# Patient Record
Sex: FEMALE | Race: WHITE
[De-identification: names, ages, dates, MRNs, and addresses within clinical notes are randomized per-mention and may not be internally consistent; named-entity substitution may affect disease eponyms.]

---

## 2017-01-09 ENCOUNTER — HOSPITAL ENCOUNTER (OUTPATIENT)
Dept: HOSPITAL 19 - BHSO | Age: 77
End: 2017-01-09
Attending: PSYCHIATRY & NEUROLOGY
Payer: MEDICARE

## 2017-01-09 DIAGNOSIS — F33.1: Primary | ICD-10-CM

## 2017-02-09 ENCOUNTER — HOSPITAL ENCOUNTER (OUTPATIENT)
Dept: HOSPITAL 19 - MC.RAD | Age: 77
End: 2017-02-09
Attending: OBSTETRICS & GYNECOLOGY
Payer: MEDICARE

## 2017-02-09 DIAGNOSIS — Z12.31: Primary | ICD-10-CM

## 2017-07-06 ENCOUNTER — HOSPITAL ENCOUNTER (OUTPATIENT)
Dept: HOSPITAL 19 - BHSO | Age: 77
End: 2017-07-06
Attending: PSYCHIATRY & NEUROLOGY
Payer: MEDICARE

## 2017-07-06 DIAGNOSIS — F31.74: Primary | ICD-10-CM

## 2018-01-01 ENCOUNTER — HOSPITAL ENCOUNTER (EMERGENCY)
Dept: HOSPITAL 19 - COL.ER | Age: 78
Discharge: HOME | End: 2018-01-01
Payer: MEDICARE

## 2018-01-01 VITALS — WEIGHT: 140.21 LBS | BODY MASS INDEX: 23.36 KG/M2 | HEIGHT: 65 IN

## 2018-01-01 VITALS — TEMPERATURE: 97.6 F

## 2018-01-01 VITALS — SYSTOLIC BLOOD PRESSURE: 175 MMHG | DIASTOLIC BLOOD PRESSURE: 82 MMHG | HEART RATE: 87 BPM

## 2018-01-01 DIAGNOSIS — I10: ICD-10-CM

## 2018-01-01 DIAGNOSIS — E11.9: ICD-10-CM

## 2018-01-01 DIAGNOSIS — J32.9: Primary | ICD-10-CM

## 2018-01-01 LAB
ALBUMIN SERPL-MCNC: 4.9 GM/DL (ref 3.5–5)
ALP SERPL-CCNC: 140 U/L (ref 50–136)
ALT SERPL-CCNC: 31 U/L (ref 9–52)
ANION GAP SERPL CALC-SCNC: 13 MMOL/L (ref 7–16)
AST SERPL-CCNC: 20 U/L (ref 15–37)
BASOPHILS # BLD: 0.1 10*3/UL (ref 0–0.2)
BASOPHILS NFR BLD AUTO: 0.5 % (ref 0–2)
BILIRUB SERPL-MCNC: 0.4 MG/DL (ref 0–1)
BUN SERPL-MCNC: 22 MG/DL (ref 7–17)
CALCIUM SERPL-MCNC: 9.9 MG/DL (ref 8.4–10.2)
CHLORIDE SERPL-SCNC: 103 MMOL/L (ref 98–107)
CO2 SERPL-SCNC: 25 MMOL/L (ref 22–30)
CREAT SERPL-SCNC: 1.19 MG/DL (ref 0.52–1.25)
CRP SERPL-MCNC: 0.5 MG/DL (ref 0–0.9)
EOSINOPHIL # BLD: 0.1 10*3/UL (ref 0–0.7)
EOSINOPHIL NFR BLD: 1.3 % (ref 0–4)
ERYTHROCYTE [DISTWIDTH] IN BLOOD BY AUTOMATED COUNT: 13.2 % (ref 11.5–14.5)
GLUCOSE SERPL-MCNC: 120 MG/DL (ref 74–106)
GRANULOCYTES # BLD AUTO: 64.6 % (ref 42.2–75.2)
HCT VFR BLD AUTO: 42.6 % (ref 37–47)
HGB BLD-MCNC: 13.9 G/DL (ref 12.5–16)
LYMPHOCYTES # BLD: 2.7 10*3/UL (ref 1.2–3.4)
LYMPHOCYTES NFR BLD: 26.6 % (ref 20–51)
MCH RBC QN AUTO: 29 PG (ref 27–31)
MCHC RBC AUTO-ENTMCNC: 33 G/DL (ref 33–37)
MCV RBC AUTO: 89 FL (ref 80–100)
MONOCYTES # BLD: 0.7 10*3/UL (ref 0.1–0.6)
MONOCYTES NFR BLD AUTO: 6.5 % (ref 1.7–9.3)
NEUTROPHILS # BLD: 6.6 10*3/UL (ref 1.4–6.5)
PH UR STRIP.AUTO: 5 [PH] (ref 5–8)
PLATELET # BLD AUTO: 287 K/MM3 (ref 130–400)
PMV BLD AUTO: 9.4 FL (ref 7.4–10.4)
POTASSIUM SERPL-SCNC: 4 MMOL/L (ref 3.4–5)
PROT SERPL-MCNC: 8 GM/DL (ref 6.4–8.2)
RBC # BLD AUTO: 4.79 M/MM3 (ref 4.1–5.3)
SODIUM SERPL-SCNC: 141 MMOL/L (ref 137–145)
SP GR UR STRIP.AUTO: 1 (ref 1–1.03)
SQUAMOUS # URNS: (no result) /HPF
URN COLLECT METHOD CLASS: (no result)

## 2018-01-04 ENCOUNTER — HOSPITAL ENCOUNTER (OUTPATIENT)
Dept: HOSPITAL 19 - BHSO | Age: 78
End: 2018-01-04
Attending: PSYCHIATRY & NEUROLOGY
Payer: MEDICARE

## 2018-01-04 DIAGNOSIS — F33.1: Primary | ICD-10-CM

## 2018-01-04 PROCEDURE — G0463 HOSPITAL OUTPT CLINIC VISIT: HCPCS

## 2018-05-02 ENCOUNTER — HOSPITAL ENCOUNTER (OUTPATIENT)
Dept: HOSPITAL 19 - BHSO | Age: 78
End: 2018-05-02
Attending: PSYCHIATRY & NEUROLOGY
Payer: MEDICARE

## 2018-05-02 DIAGNOSIS — F31.81: Primary | ICD-10-CM

## 2018-05-02 PROCEDURE — G0463 HOSPITAL OUTPT CLINIC VISIT: HCPCS

## 2018-08-14 ENCOUNTER — HOSPITAL ENCOUNTER (OUTPATIENT)
Dept: HOSPITAL 19 - COL.RAD | Age: 78
End: 2018-08-14
Attending: PSYCHIATRY & NEUROLOGY
Payer: MEDICARE

## 2018-08-14 DIAGNOSIS — G45.0: ICD-10-CM

## 2018-08-14 DIAGNOSIS — I63.9: Primary | ICD-10-CM

## 2018-09-17 ENCOUNTER — HOSPITAL ENCOUNTER (OUTPATIENT)
Dept: HOSPITAL 19 - MC.RAD | Age: 78
End: 2018-09-17
Attending: FAMILY MEDICINE
Payer: MEDICARE

## 2018-09-17 DIAGNOSIS — Z12.31: Primary | ICD-10-CM

## 2018-10-02 ENCOUNTER — HOSPITAL ENCOUNTER (OUTPATIENT)
Dept: HOSPITAL 19 - COL.RAD | Age: 78
End: 2018-10-02
Attending: PHYSICIAN ASSISTANT
Payer: MEDICARE

## 2018-10-02 DIAGNOSIS — K63.89: ICD-10-CM

## 2018-10-02 DIAGNOSIS — K21.9: ICD-10-CM

## 2018-10-02 DIAGNOSIS — K58.9: Primary | ICD-10-CM

## 2018-10-02 PROCEDURE — A9541 TC99M SULFUR COLLOID: HCPCS

## 2018-11-06 ENCOUNTER — HOSPITAL ENCOUNTER (OUTPATIENT)
Dept: HOSPITAL 19 - BHSO | Age: 78
End: 2018-11-06
Attending: PSYCHIATRY & NEUROLOGY
Payer: MEDICARE

## 2018-11-06 DIAGNOSIS — F41.1: Primary | ICD-10-CM

## 2018-11-06 PROCEDURE — G0463 HOSPITAL OUTPT CLINIC VISIT: HCPCS

## 2019-01-24 ENCOUNTER — HOSPITAL ENCOUNTER (OUTPATIENT)
Dept: HOSPITAL 19 - COL.CAR | Age: 79
Discharge: HOME | End: 2019-01-24
Attending: INTERNAL MEDICINE
Payer: MEDICARE

## 2019-01-24 VITALS — SYSTOLIC BLOOD PRESSURE: 160 MMHG | DIASTOLIC BLOOD PRESSURE: 56 MMHG | HEART RATE: 80 BPM

## 2019-01-24 VITALS — DIASTOLIC BLOOD PRESSURE: 60 MMHG | TEMPERATURE: 98 F | HEART RATE: 64 BPM | SYSTOLIC BLOOD PRESSURE: 164 MMHG

## 2019-01-24 VITALS — SYSTOLIC BLOOD PRESSURE: 156 MMHG | HEART RATE: 72 BPM | DIASTOLIC BLOOD PRESSURE: 61 MMHG

## 2019-01-24 VITALS — WEIGHT: 145.51 LBS | BODY MASS INDEX: 24.24 KG/M2 | HEIGHT: 65 IN

## 2019-01-24 VITALS — HEART RATE: 79 BPM | DIASTOLIC BLOOD PRESSURE: 64 MMHG | SYSTOLIC BLOOD PRESSURE: 158 MMHG

## 2019-01-24 VITALS — DIASTOLIC BLOOD PRESSURE: 58 MMHG | HEART RATE: 81 BPM | SYSTOLIC BLOOD PRESSURE: 142 MMHG

## 2019-01-24 DIAGNOSIS — Z79.84: ICD-10-CM

## 2019-01-24 DIAGNOSIS — Z87.891: ICD-10-CM

## 2019-01-24 DIAGNOSIS — I63.9: Primary | ICD-10-CM

## 2019-01-24 DIAGNOSIS — I08.1: ICD-10-CM

## 2019-01-24 DIAGNOSIS — E78.5: ICD-10-CM

## 2019-01-24 DIAGNOSIS — I25.10: ICD-10-CM

## 2019-01-24 DIAGNOSIS — Z82.5: ICD-10-CM

## 2019-01-24 DIAGNOSIS — Z88.8: ICD-10-CM

## 2019-01-24 DIAGNOSIS — Z83.3: ICD-10-CM

## 2019-01-24 DIAGNOSIS — I65.21: ICD-10-CM

## 2019-01-24 DIAGNOSIS — I70.1: ICD-10-CM

## 2019-01-24 DIAGNOSIS — Z82.49: ICD-10-CM

## 2019-01-24 DIAGNOSIS — Z79.82: ICD-10-CM

## 2019-01-24 DIAGNOSIS — J45.909: ICD-10-CM

## 2019-01-24 DIAGNOSIS — Z88.2: ICD-10-CM

## 2019-01-24 DIAGNOSIS — E11.9: ICD-10-CM

## 2019-01-24 DIAGNOSIS — I10: ICD-10-CM

## 2019-01-24 DIAGNOSIS — Z88.0: ICD-10-CM

## 2019-01-24 LAB
ANION GAP SERPL CALC-SCNC: 9 MMOL/L (ref 7–16)
BUN SERPL-MCNC: 22 MG/DL (ref 7–17)
CALCIUM SERPL-MCNC: 9.5 MG/DL (ref 8.4–10.2)
CHLORIDE SERPL-SCNC: 104 MMOL/L (ref 98–107)
CO2 SERPL-SCNC: 27 MMOL/L (ref 22–30)
CREAT SERPL-SCNC: 1.09 MG/DL (ref 0.52–1.25)
ERYTHROCYTE [DISTWIDTH] IN BLOOD BY AUTOMATED COUNT: 13.6 % (ref 11.5–14.5)
GLUCOSE SERPL-MCNC: 134 MG/DL (ref 74–106)
HCT VFR BLD AUTO: 39.4 % (ref 37–47)
HGB BLD-MCNC: 12.6 G/DL (ref 12.5–16)
INR BLD: 1 (ref 0.8–3)
MCH RBC QN AUTO: 28 PG (ref 27–31)
MCHC RBC AUTO-ENTMCNC: 32 G/DL (ref 33–37)
MCV RBC AUTO: 88 FL (ref 80–100)
PLATELET # BLD AUTO: 230 K/MM3 (ref 130–400)
PMV BLD AUTO: 10.1 FL (ref 7.4–10.4)
POTASSIUM SERPL-SCNC: 5 MMOL/L (ref 3.4–5)
PROTHROMBIN TIME: 11.4 SECONDS (ref 9.7–12.8)
RBC # BLD AUTO: 4.47 M/MM3 (ref 4.1–5.3)
SODIUM SERPL-SCNC: 140 MMOL/L (ref 137–145)

## 2019-01-24 NOTE — NUR
iv d'cd intact. daughter in room, pt dressed. Reviewed discharge inst. with pt
and daughter. pt has appt, will  new antibiotic script at [facility].
Reviewed inst. with device, also care on incision site, shower and leaving
steri strips on, with verbal understanding. Pt discharged via w/c to car with
daughter with information and box with device

## 2019-01-24 NOTE — NUR
recieved report from cath lab. pt is awake, does not remember procedure at
all. No c/o. call light in reach. dressing over center of chest is clean and
dry

## 2019-02-07 ENCOUNTER — HOSPITAL ENCOUNTER (OUTPATIENT)
Dept: HOSPITAL 19 - BHSO | Age: 79
End: 2019-02-07
Attending: PSYCHIATRY & NEUROLOGY
Payer: MEDICARE

## 2019-02-07 DIAGNOSIS — F31.81: Primary | ICD-10-CM

## 2019-02-07 PROCEDURE — G0463 HOSPITAL OUTPT CLINIC VISIT: HCPCS

## 2019-03-09 ENCOUNTER — HOSPITAL ENCOUNTER (EMERGENCY)
Dept: HOSPITAL 19 - COL.ER | Age: 79
Discharge: HOME | End: 2019-03-09
Payer: MEDICARE

## 2019-03-09 VITALS — HEIGHT: 64.02 IN | BODY MASS INDEX: 24.8 KG/M2 | WEIGHT: 145.28 LBS

## 2019-03-09 VITALS — DIASTOLIC BLOOD PRESSURE: 84 MMHG | SYSTOLIC BLOOD PRESSURE: 144 MMHG | TEMPERATURE: 98.5 F

## 2019-03-09 VITALS — HEART RATE: 75 BPM

## 2019-03-09 DIAGNOSIS — E11.9: ICD-10-CM

## 2019-03-09 DIAGNOSIS — E78.5: ICD-10-CM

## 2019-03-09 DIAGNOSIS — Z77.22: ICD-10-CM

## 2019-03-09 DIAGNOSIS — J45.909: ICD-10-CM

## 2019-03-09 DIAGNOSIS — Z79.84: ICD-10-CM

## 2019-03-09 DIAGNOSIS — J10.1: Primary | ICD-10-CM

## 2019-03-09 LAB
ALBUMIN SERPL-MCNC: 3.8 GM/DL (ref 3.5–5)
ALP SERPL-CCNC: 81 U/L (ref 50–136)
ALT SERPL-CCNC: 23 U/L (ref 9–52)
ANION GAP SERPL CALC-SCNC: 9 MMOL/L (ref 7–16)
AST SERPL-CCNC: 22 U/L (ref 15–37)
BASOPHILS # BLD: 0 10*3/UL (ref 0–0.2)
BASOPHILS NFR BLD AUTO: 0.2 % (ref 0–2)
BILIRUB SERPL-MCNC: 0.1 MG/DL (ref 0–1)
BUN SERPL-MCNC: 21 MG/DL (ref 7–17)
CALCIUM SERPL-MCNC: 8.5 MG/DL (ref 8.4–10.2)
CHLORIDE SERPL-SCNC: 102 MMOL/L (ref 98–107)
CO2 SERPL-SCNC: 25 MMOL/L (ref 22–30)
CREAT SERPL-SCNC: 1.19 MG/DL (ref 0.52–1.25)
EOSINOPHIL # BLD: 0.1 10*3/UL (ref 0–0.7)
EOSINOPHIL NFR BLD: 0.9 % (ref 0–4)
ERYTHROCYTE [DISTWIDTH] IN BLOOD BY AUTOMATED COUNT: 13.6 % (ref 11.5–14.5)
GLUCOSE SERPL-MCNC: 155 MG/DL (ref 74–106)
GRANULOCYTES # BLD AUTO: 63.1 % (ref 42.2–75.2)
HCT VFR BLD AUTO: 38.7 % (ref 37–47)
HGB BLD-MCNC: 12.2 G/DL (ref 12.5–16)
LYMPHOCYTES # BLD: 1.5 10*3/UL (ref 1.2–3.4)
LYMPHOCYTES NFR BLD: 23.4 % (ref 20–51)
MCH RBC QN AUTO: 28 PG (ref 27–31)
MCHC RBC AUTO-ENTMCNC: 32 G/DL (ref 33–37)
MCV RBC AUTO: 90 FL (ref 80–100)
MONOCYTES # BLD: 0.8 10*3/UL (ref 0.1–0.6)
MONOCYTES NFR BLD AUTO: 12.1 % (ref 1.7–9.3)
NEUTROPHILS # BLD: 4.1 10*3/UL (ref 1.4–6.5)
PLATELET # BLD AUTO: 157 K/MM3 (ref 130–400)
PMV BLD AUTO: 10.1 FL (ref 7.4–10.4)
POTASSIUM SERPL-SCNC: 4.3 MMOL/L (ref 3.4–5)
PROT SERPL-MCNC: 6.5 GM/DL (ref 6.4–8.2)
RBC # BLD AUTO: 4.31 M/MM3 (ref 4.1–5.3)
SODIUM SERPL-SCNC: 136 MMOL/L (ref 137–145)
TROPONIN I SERPL-MCNC: < 0.012 NG/ML (ref 0–0.04)

## 2019-05-08 ENCOUNTER — HOSPITAL ENCOUNTER (OUTPATIENT)
Dept: HOSPITAL 19 - EUO | Age: 79
LOS: 1 days | Discharge: HOME | End: 2019-05-09
Attending: FAMILY MEDICINE
Payer: MEDICARE

## 2019-05-08 VITALS — HEIGHT: 64.02 IN | BODY MASS INDEX: 25.29 KG/M2 | WEIGHT: 148.15 LBS

## 2019-05-08 DIAGNOSIS — M81.0: Primary | ICD-10-CM

## 2019-05-09 VITALS — SYSTOLIC BLOOD PRESSURE: 150 MMHG | HEART RATE: 61 BPM | DIASTOLIC BLOOD PRESSURE: 53 MMHG | TEMPERATURE: 97.4 F

## 2019-07-23 ENCOUNTER — HOSPITAL ENCOUNTER (OUTPATIENT)
Dept: HOSPITAL 19 - BHSO | Age: 79
End: 2019-07-23
Attending: PSYCHIATRY & NEUROLOGY
Payer: MEDICARE

## 2019-07-23 DIAGNOSIS — F31.81: Primary | ICD-10-CM

## 2019-07-23 PROCEDURE — G0463 HOSPITAL OUTPT CLINIC VISIT: HCPCS

## 2019-08-14 ENCOUNTER — HOSPITAL ENCOUNTER (OUTPATIENT)
Dept: HOSPITAL 6 - OPPGERO | Age: 79
LOS: 16 days | End: 2019-08-30
Payer: MEDICARE

## 2019-08-14 DIAGNOSIS — Z79.02: ICD-10-CM

## 2019-08-14 DIAGNOSIS — I10: ICD-10-CM

## 2019-08-14 DIAGNOSIS — E78.5: ICD-10-CM

## 2019-08-14 DIAGNOSIS — E11.9: ICD-10-CM

## 2019-08-14 DIAGNOSIS — G45.9: ICD-10-CM

## 2019-08-14 DIAGNOSIS — F41.1: ICD-10-CM

## 2019-08-14 DIAGNOSIS — Z79.51: ICD-10-CM

## 2019-08-14 DIAGNOSIS — K21.9: ICD-10-CM

## 2019-08-14 DIAGNOSIS — F31.30: Primary | ICD-10-CM

## 2019-09-01 ENCOUNTER — HOSPITAL ENCOUNTER (OUTPATIENT)
Dept: HOSPITAL 6 - OPPGERO | Age: 79
LOS: 29 days | Discharge: STILL ADMITTED OR ADMINISTRATIVE | End: 2019-09-30
Payer: MEDICARE

## 2019-09-01 DIAGNOSIS — J45.909: ICD-10-CM

## 2019-09-01 DIAGNOSIS — E11.9: ICD-10-CM

## 2019-09-01 DIAGNOSIS — I10: ICD-10-CM

## 2019-09-01 DIAGNOSIS — F31.81: Primary | ICD-10-CM

## 2019-09-01 DIAGNOSIS — Z79.51: ICD-10-CM

## 2019-09-01 DIAGNOSIS — F41.1: ICD-10-CM

## 2019-10-01 ENCOUNTER — HOSPITAL ENCOUNTER (OUTPATIENT)
Dept: HOSPITAL 6 - OPPGERO | Age: 79
LOS: 30 days | Discharge: STILL ADMITTED OR ADMINISTRATIVE | End: 2019-10-31
Payer: MEDICARE

## 2019-10-01 DIAGNOSIS — Z79.02: ICD-10-CM

## 2019-10-01 DIAGNOSIS — F31.81: Primary | ICD-10-CM

## 2019-10-01 DIAGNOSIS — Z79.51: ICD-10-CM

## 2019-10-01 DIAGNOSIS — Z79.84: ICD-10-CM

## 2019-10-01 DIAGNOSIS — F41.1: ICD-10-CM

## 2019-10-01 DIAGNOSIS — I10: ICD-10-CM

## 2019-10-01 DIAGNOSIS — G45.9: ICD-10-CM

## 2019-10-01 DIAGNOSIS — J45.909: ICD-10-CM

## 2019-11-01 ENCOUNTER — HOSPITAL ENCOUNTER (OUTPATIENT)
Dept: HOSPITAL 6 - OPPGERO | Age: 79
LOS: 26 days | Discharge: STILL ADMITTED OR ADMINISTRATIVE | End: 2019-11-27
Payer: MEDICARE

## 2019-11-01 DIAGNOSIS — I10: ICD-10-CM

## 2019-11-01 DIAGNOSIS — Z79.899: ICD-10-CM

## 2019-11-01 DIAGNOSIS — F41.1: ICD-10-CM

## 2019-11-01 DIAGNOSIS — Z79.01: ICD-10-CM

## 2019-11-01 DIAGNOSIS — E11.9: ICD-10-CM

## 2019-11-01 DIAGNOSIS — F31.81: Primary | ICD-10-CM

## 2019-11-01 DIAGNOSIS — G45.9: ICD-10-CM

## 2019-11-01 DIAGNOSIS — J45.909: ICD-10-CM

## 2019-11-01 DIAGNOSIS — Z79.51: ICD-10-CM

## 2019-11-21 ENCOUNTER — HOSPITAL ENCOUNTER (OUTPATIENT)
Dept: HOSPITAL 19 - EUO | Age: 79
Discharge: HOME | End: 2019-11-21
Attending: FAMILY MEDICINE
Payer: MEDICARE

## 2019-11-21 VITALS — HEART RATE: 72 BPM | TEMPERATURE: 98.1 F | DIASTOLIC BLOOD PRESSURE: 62 MMHG | SYSTOLIC BLOOD PRESSURE: 143 MMHG

## 2019-11-21 VITALS — BODY MASS INDEX: 24.79 KG/M2 | WEIGHT: 148.81 LBS | HEIGHT: 65 IN

## 2019-11-21 DIAGNOSIS — M81.0: Primary | ICD-10-CM

## 2019-12-02 ENCOUNTER — HOSPITAL ENCOUNTER (OUTPATIENT)
Dept: HOSPITAL 6 - OPPGERO | Age: 79
LOS: 29 days | Discharge: STILL ADMITTED OR ADMINISTRATIVE | End: 2019-12-31
Payer: MEDICARE

## 2019-12-02 DIAGNOSIS — Z79.899: ICD-10-CM

## 2019-12-02 DIAGNOSIS — Z79.84: ICD-10-CM

## 2019-12-02 DIAGNOSIS — I10: ICD-10-CM

## 2019-12-02 DIAGNOSIS — J45.909: ICD-10-CM

## 2019-12-02 DIAGNOSIS — F41.1: ICD-10-CM

## 2019-12-02 DIAGNOSIS — E11.9: ICD-10-CM

## 2019-12-02 DIAGNOSIS — F31.81: Primary | ICD-10-CM

## 2020-01-02 ENCOUNTER — HOSPITAL ENCOUNTER (OUTPATIENT)
Dept: HOSPITAL 6 - OPPGERO | Age: 80
LOS: 29 days | End: 2020-01-31
Payer: MEDICARE

## 2020-01-02 DIAGNOSIS — F31.81: Primary | ICD-10-CM

## 2020-01-02 DIAGNOSIS — G45.9: ICD-10-CM

## 2020-01-02 DIAGNOSIS — Z79.899: ICD-10-CM

## 2020-01-02 DIAGNOSIS — I10: ICD-10-CM

## 2020-01-02 DIAGNOSIS — F41.1: ICD-10-CM

## 2020-01-02 DIAGNOSIS — E11.9: ICD-10-CM

## 2020-01-02 DIAGNOSIS — J45.909: ICD-10-CM

## 2020-01-02 DIAGNOSIS — Z79.84: ICD-10-CM

## 2020-01-23 ENCOUNTER — HOSPITAL ENCOUNTER (OUTPATIENT)
Dept: HOSPITAL 19 - MC.RAD | Age: 80
End: 2020-01-23
Attending: OBSTETRICS & GYNECOLOGY
Payer: MEDICARE

## 2020-01-23 DIAGNOSIS — Z12.31: Primary | ICD-10-CM

## 2020-02-03 ENCOUNTER — HOSPITAL ENCOUNTER (OUTPATIENT)
Dept: HOSPITAL 6 - OPPGERO | Age: 80
LOS: 25 days | End: 2020-02-28
Payer: MEDICARE

## 2020-02-03 DIAGNOSIS — F41.1: ICD-10-CM

## 2020-02-03 DIAGNOSIS — Z79.899: ICD-10-CM

## 2020-02-03 DIAGNOSIS — Z79.51: ICD-10-CM

## 2020-02-03 DIAGNOSIS — I10: ICD-10-CM

## 2020-02-03 DIAGNOSIS — G45.9: ICD-10-CM

## 2020-02-03 DIAGNOSIS — F31.75: Primary | ICD-10-CM

## 2020-02-03 DIAGNOSIS — G31.84: ICD-10-CM

## 2020-02-03 DIAGNOSIS — E11.9: ICD-10-CM

## 2020-02-03 DIAGNOSIS — J45.909: ICD-10-CM

## 2020-03-03 ENCOUNTER — HOSPITAL ENCOUNTER (OUTPATIENT)
Dept: HOSPITAL 19 - BHSO | Age: 80
End: 2020-03-03
Attending: PSYCHIATRY & NEUROLOGY
Payer: MEDICARE

## 2020-03-03 DIAGNOSIS — F31.81: Primary | ICD-10-CM

## 2020-03-03 PROCEDURE — G0463 HOSPITAL OUTPT CLINIC VISIT: HCPCS

## 2020-06-29 ENCOUNTER — HOSPITAL ENCOUNTER (OUTPATIENT)
Dept: HOSPITAL 19 - EUO | Age: 80
Discharge: HOME | End: 2020-06-29
Attending: FAMILY MEDICINE
Payer: MEDICARE

## 2020-06-29 VITALS — DIASTOLIC BLOOD PRESSURE: 50 MMHG | TEMPERATURE: 98.1 F | HEART RATE: 79 BPM | SYSTOLIC BLOOD PRESSURE: 137 MMHG

## 2020-06-29 VITALS — HEIGHT: 65 IN | WEIGHT: 146.17 LBS | BODY MASS INDEX: 24.35 KG/M2

## 2020-06-29 DIAGNOSIS — M81.0: Primary | ICD-10-CM

## 2020-07-14 ENCOUNTER — HOSPITAL ENCOUNTER (OUTPATIENT)
Dept: HOSPITAL 19 - BHSO | Age: 80
End: 2020-07-14
Attending: PSYCHIATRY & NEUROLOGY
Payer: MEDICARE

## 2020-07-14 DIAGNOSIS — F31.81: Primary | ICD-10-CM

## 2020-07-14 PROCEDURE — G0463 HOSPITAL OUTPT CLINIC VISIT: HCPCS

## 2021-03-03 ENCOUNTER — HOSPITAL ENCOUNTER (OUTPATIENT)
Dept: HOSPITAL 19 - EUO | Age: 81
Discharge: HOME | End: 2021-03-03
Attending: FAMILY MEDICINE
Payer: MEDICARE

## 2021-03-03 VITALS — BODY MASS INDEX: 23.51 KG/M2 | WEIGHT: 141.1 LBS | HEIGHT: 65 IN

## 2021-03-03 VITALS — SYSTOLIC BLOOD PRESSURE: 124 MMHG | DIASTOLIC BLOOD PRESSURE: 82 MMHG | HEART RATE: 66 BPM | TEMPERATURE: 98.5 F

## 2021-03-03 DIAGNOSIS — M81.0: Primary | ICD-10-CM

## 2021-07-21 ENCOUNTER — HOSPITAL ENCOUNTER (OUTPATIENT)
Dept: HOSPITAL 19 - WSPT | Age: 81
LOS: 14 days | Discharge: HOME | End: 2021-08-04
Attending: FAMILY MEDICINE
Payer: MEDICARE

## 2021-07-21 DIAGNOSIS — M47.816: ICD-10-CM

## 2021-07-21 DIAGNOSIS — M51.36: Primary | ICD-10-CM

## 2021-09-15 ENCOUNTER — HOSPITAL ENCOUNTER (OUTPATIENT)
Dept: HOSPITAL 19 - EUO | Age: 81
Discharge: HOME | End: 2021-09-15
Attending: FAMILY MEDICINE
Payer: MEDICARE

## 2021-09-15 VITALS — BODY MASS INDEX: 23.76 KG/M2 | HEIGHT: 65 IN | WEIGHT: 142.64 LBS

## 2021-09-15 VITALS — SYSTOLIC BLOOD PRESSURE: 165 MMHG | DIASTOLIC BLOOD PRESSURE: 54 MMHG | HEART RATE: 59 BPM | TEMPERATURE: 97.6 F

## 2021-09-15 DIAGNOSIS — M81.0: Primary | ICD-10-CM

## 2022-01-04 ENCOUNTER — HOSPITAL ENCOUNTER (OUTPATIENT)
Dept: HOSPITAL 19 - COL.RAD | Age: 82
End: 2022-01-04
Attending: FAMILY MEDICINE
Payer: MEDICARE

## 2022-01-04 DIAGNOSIS — I67.82: ICD-10-CM

## 2022-01-04 DIAGNOSIS — G31.9: Primary | ICD-10-CM

## 2022-03-17 ENCOUNTER — HOSPITAL ENCOUNTER (OUTPATIENT)
Dept: HOSPITAL 19 - EUO | Age: 82
Discharge: HOME | End: 2022-03-17
Attending: FAMILY MEDICINE
Payer: MEDICARE

## 2022-03-17 VITALS — HEIGHT: 65 IN | WEIGHT: 140.88 LBS | BODY MASS INDEX: 23.47 KG/M2

## 2022-03-17 VITALS — HEART RATE: 66 BPM | SYSTOLIC BLOOD PRESSURE: 111 MMHG | TEMPERATURE: 97.7 F | DIASTOLIC BLOOD PRESSURE: 69 MMHG

## 2022-03-17 DIAGNOSIS — M81.0: Primary | ICD-10-CM

## 2022-04-06 ENCOUNTER — HOSPITAL ENCOUNTER (EMERGENCY)
Dept: HOSPITAL 19 - COL.ER | Age: 82
Discharge: HOME | End: 2022-04-06
Attending: EMERGENCY MEDICINE
Payer: MEDICARE

## 2022-04-06 VITALS — TEMPERATURE: 97.7 F

## 2022-04-06 VITALS — BODY MASS INDEX: 23.36 KG/M2 | HEIGHT: 65 IN | WEIGHT: 140.21 LBS

## 2022-04-06 VITALS — HEART RATE: 69 BPM | SYSTOLIC BLOOD PRESSURE: 150 MMHG | DIASTOLIC BLOOD PRESSURE: 75 MMHG

## 2022-04-06 DIAGNOSIS — Z88.8: ICD-10-CM

## 2022-04-06 DIAGNOSIS — Z87.891: ICD-10-CM

## 2022-04-06 DIAGNOSIS — M54.9: Primary | ICD-10-CM

## 2022-04-19 ENCOUNTER — HOSPITAL ENCOUNTER (OUTPATIENT)
Dept: HOSPITAL 19 - MHCPAIN | Age: 82
End: 2022-04-19
Attending: ANESTHESIOLOGY
Payer: MEDICARE

## 2022-04-19 DIAGNOSIS — M53.3: ICD-10-CM

## 2022-04-19 DIAGNOSIS — M47.817: Primary | ICD-10-CM

## 2022-04-19 DIAGNOSIS — M54.16: ICD-10-CM

## 2022-04-19 DIAGNOSIS — M25.551: ICD-10-CM

## 2022-04-19 PROCEDURE — G0463 HOSPITAL OUTPT CLINIC VISIT: HCPCS

## 2022-04-28 ENCOUNTER — HOSPITAL ENCOUNTER (OUTPATIENT)
Dept: HOSPITAL 19 - MHCPAIN | Age: 82
End: 2022-04-28
Attending: ANESTHESIOLOGY
Payer: MEDICARE

## 2022-04-28 DIAGNOSIS — M53.3: ICD-10-CM

## 2022-04-28 DIAGNOSIS — M54.16: ICD-10-CM

## 2022-04-28 DIAGNOSIS — M47.817: Primary | ICD-10-CM

## 2022-05-17 ENCOUNTER — HOSPITAL ENCOUNTER (OUTPATIENT)
Dept: HOSPITAL 19 - MHCPAIN | Age: 82
End: 2022-05-17
Attending: ANESTHESIOLOGY
Payer: MEDICARE

## 2022-05-17 DIAGNOSIS — M53.3: ICD-10-CM

## 2022-05-17 DIAGNOSIS — M54.16: ICD-10-CM

## 2022-05-17 DIAGNOSIS — M47.817: Primary | ICD-10-CM

## 2022-05-17 DIAGNOSIS — G89.29: ICD-10-CM

## 2022-05-17 PROCEDURE — G0463 HOSPITAL OUTPT CLINIC VISIT: HCPCS

## 2022-06-02 ENCOUNTER — HOSPITAL ENCOUNTER (OUTPATIENT)
Dept: HOSPITAL 19 - MHCPAIN | Age: 82
End: 2022-06-02
Attending: ANESTHESIOLOGY
Payer: MEDICARE

## 2022-06-02 DIAGNOSIS — M54.16: ICD-10-CM

## 2022-06-02 DIAGNOSIS — M47.817: Primary | ICD-10-CM

## 2022-06-02 DIAGNOSIS — M53.3: ICD-10-CM

## 2022-06-13 ENCOUNTER — HOSPITAL ENCOUNTER (OUTPATIENT)
Dept: HOSPITAL 19 - MHCPAIN | Age: 82
End: 2022-06-13
Attending: ANESTHESIOLOGY
Payer: MEDICARE

## 2022-06-13 DIAGNOSIS — M79.605: ICD-10-CM

## 2022-06-13 DIAGNOSIS — M47.817: ICD-10-CM

## 2022-06-13 DIAGNOSIS — M54.50: ICD-10-CM

## 2022-06-13 DIAGNOSIS — M41.26: Primary | ICD-10-CM

## 2022-06-13 PROCEDURE — G0463 HOSPITAL OUTPT CLINIC VISIT: HCPCS

## 2022-07-19 ENCOUNTER — HOSPITAL ENCOUNTER (OUTPATIENT)
Dept: HOSPITAL 19 - MHCPAIN | Age: 82
End: 2022-07-19
Attending: ANESTHESIOLOGY
Payer: MEDICARE

## 2022-07-19 DIAGNOSIS — M41.26: Primary | ICD-10-CM

## 2022-07-19 DIAGNOSIS — M47.897: ICD-10-CM

## 2022-07-19 DIAGNOSIS — M54.16: ICD-10-CM

## 2022-07-19 DIAGNOSIS — M53.3: ICD-10-CM

## 2022-07-19 PROCEDURE — G0463 HOSPITAL OUTPT CLINIC VISIT: HCPCS

## 2022-07-25 ENCOUNTER — HOSPITAL ENCOUNTER (OUTPATIENT)
Dept: HOSPITAL 19 - MHCPAIN | Age: 82
End: 2022-07-25
Attending: ANESTHESIOLOGY
Payer: MEDICARE

## 2022-07-25 DIAGNOSIS — M47.817: ICD-10-CM

## 2022-07-25 DIAGNOSIS — M53.3: Primary | ICD-10-CM

## 2022-07-25 PROCEDURE — G0260 INJ FOR SACROILIAC JT ANESTH: HCPCS

## 2022-08-16 ENCOUNTER — HOSPITAL ENCOUNTER (OUTPATIENT)
Dept: HOSPITAL 19 - MHCPAIN | Age: 82
End: 2022-08-16
Attending: ANESTHESIOLOGY
Payer: MEDICARE

## 2022-08-16 DIAGNOSIS — M54.50: Primary | ICD-10-CM

## 2022-08-16 DIAGNOSIS — M53.3: ICD-10-CM

## 2022-08-16 PROCEDURE — G0463 HOSPITAL OUTPT CLINIC VISIT: HCPCS

## 2022-09-19 ENCOUNTER — HOSPITAL ENCOUNTER (OUTPATIENT)
Dept: HOSPITAL 19 - EUO | Age: 82
Discharge: HOME | End: 2022-09-19
Attending: FAMILY MEDICINE
Payer: MEDICARE

## 2022-09-19 VITALS — DIASTOLIC BLOOD PRESSURE: 52 MMHG | TEMPERATURE: 98.1 F | HEART RATE: 68 BPM | SYSTOLIC BLOOD PRESSURE: 116 MMHG

## 2022-09-19 VITALS — BODY MASS INDEX: 22.48 KG/M2 | HEIGHT: 65 IN | WEIGHT: 134.92 LBS

## 2022-09-19 DIAGNOSIS — M81.0: Primary | ICD-10-CM

## 2023-02-24 ENCOUNTER — HOSPITAL ENCOUNTER (OUTPATIENT)
Dept: HOSPITAL 19 - MC.RAD | Age: 83
End: 2023-02-24
Attending: FAMILY MEDICINE
Payer: MEDICARE

## 2023-02-24 DIAGNOSIS — Z12.31: Primary | ICD-10-CM

## 2023-10-04 ENCOUNTER — HOSPITAL ENCOUNTER (OUTPATIENT)
Dept: HOSPITAL 19 - EUO | Age: 83
End: 2023-10-04
Attending: FAMILY MEDICINE
Payer: MEDICARE

## 2023-10-04 VITALS — SYSTOLIC BLOOD PRESSURE: 120 MMHG | TEMPERATURE: 98.3 F | DIASTOLIC BLOOD PRESSURE: 85 MMHG | HEART RATE: 68 BPM

## 2023-10-04 DIAGNOSIS — M81.0: Primary | ICD-10-CM

## 2024-10-16 ENCOUNTER — HOSPITAL ENCOUNTER (OUTPATIENT)
Dept: HOSPITAL 19 - EUO | Age: 84
End: 2024-10-16
Payer: MEDICARE

## 2024-10-16 VITALS — TEMPERATURE: 97.6 F | SYSTOLIC BLOOD PRESSURE: 130 MMHG | DIASTOLIC BLOOD PRESSURE: 71 MMHG | HEART RATE: 71 BPM

## 2024-10-16 DIAGNOSIS — M81.0: Primary | ICD-10-CM

## 2024-10-25 ENCOUNTER — HOSPITAL ENCOUNTER (OUTPATIENT)
Dept: HOSPITAL 19 - SDCO | Age: 84
End: 2024-10-25
Attending: SPECIALIST
Payer: MEDICARE

## 2024-10-25 VITALS — DIASTOLIC BLOOD PRESSURE: 73 MMHG | SYSTOLIC BLOOD PRESSURE: 141 MMHG | HEART RATE: 80 BPM

## 2024-10-25 VITALS — HEART RATE: 78 BPM | DIASTOLIC BLOOD PRESSURE: 57 MMHG | SYSTOLIC BLOOD PRESSURE: 147 MMHG

## 2024-10-25 VITALS — HEART RATE: 69 BPM | TEMPERATURE: 96.9 F | SYSTOLIC BLOOD PRESSURE: 151 MMHG | DIASTOLIC BLOOD PRESSURE: 83 MMHG

## 2024-10-25 DIAGNOSIS — K58.9: ICD-10-CM

## 2024-10-25 DIAGNOSIS — Z79.899: ICD-10-CM

## 2024-10-25 DIAGNOSIS — Z86.73: ICD-10-CM

## 2024-10-25 DIAGNOSIS — K21.9: ICD-10-CM

## 2024-10-25 DIAGNOSIS — R15.2: ICD-10-CM

## 2024-10-25 DIAGNOSIS — Z80.0: ICD-10-CM

## 2024-10-25 DIAGNOSIS — Z86.0100: ICD-10-CM

## 2024-10-25 DIAGNOSIS — K57.30: Primary | ICD-10-CM

## 2024-10-25 NOTE — NUR
1030- PT BACK TO GI [facility] FROM COLONOSCOPY. ASSISTED TO RECLINER WITH RN
ASSIST. MONITORS IN PLACE AND VS OBTAINED. PT REQUESTING CRANBERRY JUICE.
 
1045- NO COMPLAINTS OF PAIN OR NAUSEA. VS STABLE.
 
1049- DR. GOODMAN INTO TALK WITH PT. IV REMOVED PER ORDER.
 
1055- DISCHARGE INSTRUCTIONS COMPLETED WITH PT AND FAMILY AT BEDSIDE. ALL
QUESTIONS ANSWERED IN FULL.
 
1100- DISCHARGED FROM FACILITY VIA WHEELCHAIR TO PRIVATE VEHCILE DRIVEN BY
FAMILY.